# Patient Record
(demographics unavailable — no encounter records)

---

## 2024-11-12 NOTE — ASSESSMENT
[FreeTextEntry1] : She is clinically stable.  No evidence of any disease. Return to office in 3 to 4 months.  Check  today. Healthcare maintenance is not completely up-to-date.  She is due for her mammogram soon.  Return to office in another 3 to 4 months.

## 2024-11-12 NOTE — REASON FOR VISIT
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 [Spouse] : spouse

## 2024-11-12 NOTE — LETTER BODY
[Attached please find my note.] : Attached please find my note. [FreeTextEntry2] :  [place] [address] [place] [address] Tel [phone] Fax [phone]  Dear Dr White   Ms Jocelyne Barthelus was seen in my office for a surveillance examination.  Please see my consult note for her plan of care.  Thank you for allowing me to participate in the care of this patient.    Please do not hesitate to call if you have any questions.    Most Sincerely,      Mehul Youssef MD [facility] Director, [facility] Professor of Obstetrics and Gynecology, [employer] [facility] Division of Gynecologic Oncology Department Obstetrics and Gynecology Tel [phone] [email or web address]

## 2024-11-12 NOTE — PHYSICAL EXAM
[Chaperone Present] : A chaperone was present in the examining room during all aspects of the physical examination [86672] : A chaperone was present during the pelvic exam. [FreeTextEntry2] : Carolyn [Present] : CVAT: present [Absent] : Adnexa(ae): Absent [Normal] : Anus and perineum: Normal sphincter tone, no masses, no prolapse. [de-identified] : surgical incisions in tact [de-identified] : Vaginal apex intact.  Manual examination revealed a closed wound no nodularity.  No masses no tenderness no guarding on bimanual examination. [Fully active, able to carry on all pre-disease performance without restriction] : Status 0 - Fully active, able to carry on all pre-disease performance without restriction

## 2024-11-12 NOTE — REVIEW OF SYSTEMS
[Negative] : Musculoskeletal [Rash] : no rash noted [Hematuria] : no hematuria [Dysuria] : no dysuria [Ulcer] : no ulcer [URI Sx] : no ulcer sx [Chest Pain] : no chest pain [SOB on Exertion] : no shortness of breath during exertion [FreeTextEntry3] : insomnia [de-identified] : constipation, right side abdominal pain

## 2024-11-12 NOTE — HISTORY OF PRESENT ILLNESS
[FreeTextEntry1] : Patient is here for follow-up of her borderline serous tumor of the ovary. Patient was accompanied by her .    She is a 70-year old lady status post a robotic assisted laparoscopic hysterectomy bilateral salpingo-oophorectomy, omentectomy for what frozen section revealed as a borderline tumor of the ovary.  The patient feels fine today.  She has no complaints.  She has had normal GI and  function.  Her weight has increased by 3 pounds since her last visit.  The patient reported no bleeding.  7/26/3034 She felt well today.  She had no complaints of any GI or  function.  She pointed to a skin lesion on the back which appeared to be abscess versus a blocked sebaceous cyst gland.  Patient reported discomfort.  She has had this examined and taken care of in the past every 2 to 3 years or so.  4/4/2024: Patient reports right-sided abdominal pain rated 5-6/10. She does not take medication for the pain. Appetite is not great, but still eats. Weight is stable. Reports constipation sometimes, 3-4 days without a BM. Last BM was on Monday, 4/1/24. Blood pressure still elevated today despite our previous discussion with her about the importance of taking her BP medication, (/84, P85 today). She still does not take her BP medication every day. Denies fevers, vaginal discharge.  Specimen(s) Submitted 1 Uterus,cervix,bilateral tubes and bilateral ovaries 2 Omentum tissue  Final Diagnosis 1. Uterus, cervix, bilateral tubes and ovaries, total hysterectomy with bilateral salpingo-oophorectomy: - Right ovary: Serous borderline tumor, arising in a background of  endometriotic cyst. No surface involvement identified. See comment - Uterus, cervix: Benign uterus with basaloid endometrium, benign cervix - Left ovary and bilateral tubes: Unremarkable left ovary and tubes  2. Omentum, excision: - Benign lobulated omental adipose tissue - Benign incidental lymph nodes (0/2)  Specimen(s) Submitted PELVIC WASH  Final Diagnosis PELVIC WASH NEGATIVE FOR MALIGNANT CELLS.  Cytology slide shows benign mesothelial cells with histiocytes.   Onc Hx Initial diagnosis pelvic mass 2/7/2024 seen on MRI and on CT scan 2/9/24 Surgery 2/26/24 robotic assisted total laparoscopic hysterectomy bilateral salpingo-oophorectomy and omentectomy  Pathology was a right ovarian serous borderline tumor arising in the background of endometriotic cyst.  Healthcare maintenance Pap smear 2023 Mammogram 11/2023  she is aware that she is due for one Colonoscopy she has never had 1 done as she declined

## 2025-03-11 NOTE — HISTORY OF PRESENT ILLNESS
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  The patient was diagnosed with a right ovarian serous borderline tumor, and was status post arobotic assisted total laparoscopic hysterectomy bilateral salpingo-oophorectomy and omentectomy. Procedure was done on 02/26/24.   Last CT scan (02/09/2024-[facility])  Patient felt well today. She reported no fever, chills, nausea, vomiting, diarrhea, or vaginal bleeding.  There was no any changes in bowel or bladder habits. Regular BM and voiding freely. There was no change in appetite or unintentional weight loss or gain.  === VISIT HISTORY 11/12/2024 The patient felt fine.  She had no complaints.  She had had normal GI and  function.  Her weight had increased by 3 pounds since her last visit.  The patient reported no bleeding.  7/26/2024 She felt well today.  She had no complaints of any GI or  function.  She pointed to a skin lesion on the back which appeared to be abscess versus a blocked sebaceous cyst gland.  Patient reported discomfort.  She has had this examined and taken care of in the past every 2 to 3 years or so.  04/04/2024: Patient reports right-sided abdominal pain rated 5-6/10. She does not take medication for the pain. Appetite is not great, but still eats. Weight is stable. Reports constipation sometimes, 3-4 days without a BM. Last BM was on Monday, 4/1/24. Blood pressure still elevated today despite our previous discussion with her about the importance of taking her BP medication, (/84, P85 today). She still does not take her BP medication every day. Denies fevers, vaginal discharge.  === PATHOLOGY REPORT (02/26/2024-[facility]) Final Diagnosis 1. Uterus, cervix, bilateral tubes and ovaries, total hysterectomy with bilateral salpingo-oophorectomy: - Right ovary: Serous borderline tumor, arising in a background ofendometriotic cyst. No surface involvement identified. See comment - Uterus, cervix: Benign uterus with basaloid endometrium, benign cervix - Left ovary and bilateral tubes: Unremarkable left ovary and tubes 2. Omentum, excision: - Benign lobulated omental adipose tissue - Benign incidental lymph nodes (0/2)  PELVIC WASH: NEGATIVE FOR MALIGNANT CELLS. Cytology slide shows benign mesothelial cells with histiocytes.  === ONCOLOGY HISTORY 02/07/2024 Initial diagnosis pelvic mass seen on MRI (11/01/2023-[facility]) and on CT scan (02/09/2024-[facility]) 02/26/2024 SURGERY: Robotic assisted total laparoscopic hysterectomy bilateral salpingo-oophorectomy and omentectomy. Pathology was a right ovarian serous borderline tumor arising in the background of endometriotic cyst.  HEALTH MAINTENANCE:   Paps (02/02/2024-[facility]) NILM.  HPV HR(+)** Mammogram 11/2023  she is aware that she is due for one  she has not had one last year.  She said she would get it from her PCP Colonoscopy she has never had 1 done as she declined.  Patient said she did have one but it was over ten years ago  she would get a referral from her PCP

## 2025-03-11 NOTE — HISTORY OF PRESENT ILLNESS
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  The patient was diagnosed with a right ovarian serous borderline tumor, and was status post arobotic assisted total laparoscopic hysterectomy bilateral salpingo-oophorectomy and omentectomy. Procedure was done on 02/26/24.   Last CT scan (02/09/2024-[facility])  Patient felt well today. She reported no fever, chills, nausea, vomiting, diarrhea, or vaginal bleeding.  There was no any changes in bowel or bladder habits. Regular BM and voiding freely. There was no change in appetite or unintentional weight loss or gain.  === VISIT HISTORY 11/12/2024 The patient felt fine.  She had no complaints.  She had had normal GI and  function.  Her weight had increased by 3 pounds since her last visit.  The patient reported no bleeding.  7/26/2024 She felt well today.  She had no complaints of any GI or  function.  She pointed to a skin lesion on the back which appeared to be abscess versus a blocked sebaceous cyst gland.  Patient reported discomfort.  She has had this examined and taken care of in the past every 2 to 3 years or so.  04/04/2024: Patient reports right-sided abdominal pain rated 5-6/10. She does not take medication for the pain. Appetite is not great, but still eats. Weight is stable. Reports constipation sometimes, 3-4 days without a BM. Last BM was on Monday, 4/1/24. Blood pressure still elevated today despite our previous discussion with her about the importance of taking her BP medication, (/84, P85 today). She still does not take her BP medication every day. Denies fevers, vaginal discharge.  === PATHOLOGY REPORT (02/26/2024-[facility]) Final Diagnosis 1. Uterus, cervix, bilateral tubes and ovaries, total hysterectomy with bilateral salpingo-oophorectomy: - Right ovary: Serous borderline tumor, arising in a background ofendometriotic cyst. No surface involvement identified. See comment - Uterus, cervix: Benign uterus with basaloid endometrium, benign cervix - Left ovary and bilateral tubes: Unremarkable left ovary and tubes 2. Omentum, excision: - Benign lobulated omental adipose tissue - Benign incidental lymph nodes (0/2)  PELVIC WASH: NEGATIVE FOR MALIGNANT CELLS. Cytology slide shows benign mesothelial cells with histiocytes.  === ONCOLOGY HISTORY 02/07/2024 Initial diagnosis pelvic mass seen on MRI (11/01/2023-[facility]) and on CT scan (02/09/2024-[facility]) 02/26/2024 SURGERY: Robotic assisted total laparoscopic hysterectomy bilateral salpingo-oophorectomy and omentectomy. Pathology was a right ovarian serous borderline tumor arising in the background of endometriotic cyst.  HEALTH MAINTENANCE:   Paps (02/02/2024-[facility]) NILM.  HPV HR(+)** Mammogram 11/2023  she is aware that she is due for one  she has not had one last year.  She said she would get it from her PCP Colonoscopy she has never had 1 done as she declined.  Patient said she did have one but it was over ten years ago  she would get a referral from her PCP

## 2025-03-11 NOTE — ASSESSMENT
[FreeTextEntry1] : The patient is clinically stable. She has no evidence of disease.   Last CT scan (02/09/2024-[facility]).   Blood tests today  Discussed importance of diet and exercise.   need to update her HCM as discussed with patient and  Return to the office visit in 3 months or earlier if the patient feels there is a need.  All questions have been answered.

## 2025-03-11 NOTE — REASON FOR VISIT
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

## 2025-03-11 NOTE — ASSESSMENT
[FreeTextEntry1] : The patient is clinically stable. She has no evidence of disease.   Last CT scan (02/09/2024-[facility]).   Blood tests today  Discussed importance of diet and exercise.   need to update her HCM as discussed with patient and  Return to the office visit in 3 months or earlier if the patient feels there is a need.  All questions have been answered.

## 2025-03-11 NOTE — REVIEW OF SYSTEMS
[de-identified] : constipation, right side abdominal pain [Negative] : Gastrointestinal [Rash] : no rash noted [Hematuria] : no hematuria [Dysuria] : no dysuria [Ulcer] : no ulcer [URI Sx] : no ulcer sx [Chest Pain] : no chest pain [SOB on Exertion] : no shortness of breath during exertion [FreeTextEntry3] : insomnia

## 2025-03-11 NOTE — PHYSICAL EXAM
[Chaperone Present] : A chaperone was present in the examining room during all aspects of the physical examination [Present] : CVAT: present [Absent] : Adnexa(ae): Absent [Fully active, able to carry on all pre-disease performance without restriction] : Status 0 - Fully active, able to carry on all pre-disease performance without restriction [Normal] : Bimanual Exam: Normal [FreeTextEntry2] : Carolyn [de-identified] : surgical incisions in tact [de-identified] : no mass palpated no nodularity noted not tender [de-identified] : Normal sphincter tone, smooth rectovaginal septum, no cul-de-sac nodules. No mass or prolapses.

## 2025-03-11 NOTE — PHYSICAL EXAM
[Chaperone Present] : A chaperone was present in the examining room during all aspects of the physical examination [Present] : CVAT: present [Absent] : Adnexa(ae): Absent [Fully active, able to carry on all pre-disease performance without restriction] : Status 0 - Fully active, able to carry on all pre-disease performance without restriction [Normal] : Bimanual Exam: Normal [FreeTextEntry2] : Carolyn [de-identified] : surgical incisions in tact [de-identified] : no mass palpated no nodularity noted not tender [de-identified] : Normal sphincter tone, smooth rectovaginal septum, no cul-de-sac nodules. No mass or prolapses.

## 2025-06-10 NOTE — ASSESSMENT
[FreeTextEntry1] : The patient is clinically stable with no current evidence of disease.  Last CT scan A/P was performed on 02/09/2024 at [facility].  We will consider repeat imaging if ca125 elevated. CA-125 blood test has been sent today. Health Maintenance: Up to date.  Discussed the importance of maintaining a healthy diet and regular exercise. The patient is advised to return for follow-up in 3 months, or sooner if any concerning symptoms arise.  All questions were addressed during the visit.

## 2025-06-10 NOTE — REASON FOR VISIT
[Spouse] : spouse [FreeTextEntry1] :  The patient presents for a 3-month surveillance evaluation for a right ovarian serous borderline tumor.

## 2025-06-10 NOTE — LETTER BODY
[Attached please find my note.] : Attached please find my note. [FreeTextEntry2] :  [place] [address] [place] [address] Tel [phone] Fax [phone]  Dear Dr White   Ms Jocelyne Barthelus was seen in my office for a follow up evaluation.  Please see my consult note for her plan of care.  Thank you for allowing me to participate in the care of this patient.    Please do not hesitate to call if you have any questions.    Most Sincerely,      Mehul Youssef MD [facility] Director, [facility] Professor of Obstetrics and Gynecology, [facility] [employer] at [facility] Division of Gynecologic Oncology Department Obstetrics and Gynecology Tel [phone] or [phone] Fax [phone] [email or web address]

## 2025-06-10 NOTE — PHYSICAL EXAM
[Present] : CVAT: present [Absent] : Adnexa(ae): Absent [Normal] : Anus and perineum: Normal sphincter tone, no masses, no prolapse. [Fully active, able to carry on all pre-disease performance without restriction] : Status 0 - Fully active, able to carry on all pre-disease performance without restriction [Chaperoned Physical Exam] : A chaperone was present in the examining room during all aspects of the physical examination. [MA] : MA [FreeTextEntry2] : Carolyn [de-identified] : surgical incisions in tact [de-identified] : no mass palpated no nodularity  noted not tender [de-identified] : Normal sphincter tone, smooth rectovaginal septum, no cul-de-sac nodules. No mass or prolapses.

## 2025-06-10 NOTE — ASSESSMENT
[FreeTextEntry1] : The patient is clinically stable with no current evidence of disease.  Last CT scan A/P was performed on 02/09/2024 at [facility].  We will consider repeat imaging if ca125 elevated. CA-125 blood test has been sent today. Health Maintenance: Up to date.  Discussed the importance of maintaining a healthy diet and regular exercise. The patient is advised to return for follow-up in 3 months, or sooner if any concerning symptoms arise.  All questions were addressed during the visit.

## 2025-06-10 NOTE — REVIEW OF SYSTEMS
[Rash] : no rash noted [Negative] : Skin [Dysuria] : no dysuria [Hematuria] : no hematuria [Ulcer] : no ulcer [URI Sx] : no ulcer sx [Chest Pain] : no chest pain [SOB on Exertion] : no shortness of breath during exertion [FreeTextEntry3] : insomnia

## 2025-06-10 NOTE — PHYSICAL EXAM
[Present] : CVAT: present [Absent] : Adnexa(ae): Absent [Normal] : Anus and perineum: Normal sphincter tone, no masses, no prolapse. [Fully active, able to carry on all pre-disease performance without restriction] : Status 0 - Fully active, able to carry on all pre-disease performance without restriction [Chaperoned Physical Exam] : A chaperone was present in the examining room during all aspects of the physical examination. [MA] : MA [FreeTextEntry2] : Carolyn [de-identified] : surgical incisions in tact [de-identified] : no mass palpated no nodularity  noted not tender [de-identified] : Normal sphincter tone, smooth rectovaginal septum, no cul-de-sac nodules. No mass or prolapses.

## 2025-06-10 NOTE — LETTER BODY
[Attached please find my note.] : Attached please find my note. [FreeTextEntry2] :  [place] [address] [place] [address] Tel [phone] Fax [phone]  Dear Dr White   Ms Jocelyne Barthelus was seen in my office for a follow up evaluation.  Please see my consult note for her plan of care.  Thank you for allowing me to participate in the care of this patient.    Please do not hesitate to call if you have any questions.    Most Sincerely,      Mehul Youssef MD [facility] Director, [facility] Professor of Obstetrics and Gynecology, [facility] [employer] at [facility] Division of Gynecologic Oncology Department Obstetrics and Gynecology Tel [phone] or [phone] Fax [phone] [email or web address]

## 2025-06-10 NOTE — HISTORY OF PRESENT ILLNESS
[FreeTextEntry1] : The patient presents for a 3-month surveillance evaluation for a right ovarian serous borderline tumor.   She was s/p of robotic-assisted total laparoscopic hysterectomy, bilateral salpingo-oophorectomy, and omentectomy, with the diagnosis of right ovarian serous borderline tumor on 02/26/2024.   Patient felt well today. Denied fever, chills, nausea, vomiting, or vaginal bleeding. Denied any changes in bowel or bladder habits. . Denied changes in appetite or unintentional weight loss or gain. no pain.    === VISIT HISTORY 11/12/2024 The patient felt fine.  She had no complaints.  She had had normal GI and  function.  Her weight had increased by 3 pounds since her last visit.  The patient reported no bleeding.  07/26/2024 She felt well today.  She had no complaints of any GI or  function.  She pointed to a skin lesion on the back which appeared to be abscess versus a blocked sebaceous cyst gland.  Patient reported discomfort.  She has had this examined and taken care of in the past every 2 to 3 years or so.  04/04/2024 Patient reports right-sided abdominal pain rated 5-6/10. She does not take medication for the pain. Appetite is not great, but still eats. Weight is stable. Reports constipation sometimes, 3-4 days without a BM. Last BM was on Monday, 4/1/24. Blood pressure still elevated today despite our previous discussion with her about the importance of taking her BP medication, (/84, P85 today). She still does not take her BP medication every day. Denies fevers, vaginal discharge.  === PATHOLOGY REPORT  Final Diagnosis (02/26/2024-[facility]) 1. Uterus, cervix, bilateral tubes and ovaries, total hysterectomy with bilateral salpingo-oophorectomy: - Right ovary: Serous borderline tumor, arising in a background ofendometriotic cyst. No surface involvement identified. See comment - Uterus, cervix: Benign uterus with basaloid endometrium, benign cervix - Left ovary and bilateral tubes: Unremarkable left ovary and tubes 2. Omentum, excision: - Benign lobulated omental adipose tissue - Benign incidental lymph nodes (0/2) PELVIC WASH: NEGATIVE FOR MALIGNANT CELLS. Cytology slide shows benign mesothelial cells with histiocytes.  === ONCOLOGY HISTORY 02/07/2024 Initial diagnosis pelvic mass seen on MRI (11/01/2023-[facility]) and on CT scan (02/09/2024-[facility]) 02/26/2024 SURGERY: Robotic assisted total laparoscopic hysterectomy bilateral salpingo-oophorectomy and omentectomy. Pathology was a right ovarian serous borderline tumor arising in the background of endometriotic cyst.  HEALTH MAINTENANCE:   Paps (02/02/2024-[facility]) NILM.  HPV HR(+)** Mammogram 11/2023  she is aware that she is due for one  she has not had one last year.  She said she would get it from her PCP Colonoscopy she has never had 1 done as she declined.  Patient said she did have one but it was over ten years ago  she would get a referral from her PCP

## 2025-06-10 NOTE — HISTORY OF PRESENT ILLNESS
[FreeTextEntry1] : The patient presents for a 3-month surveillance evaluation for a right ovarian serous borderline tumor.   She was s/p of robotic-assisted total laparoscopic hysterectomy, bilateral salpingo-oophorectomy, and omentectomy, with the diagnosis of right ovarian serous borderline tumor on 02/26/2024.   Patient felt well today. Denied fever, chills, nausea, vomiting, or vaginal bleeding. Denied any changes in bowel or bladder habits. . Denied changes in appetite or unintentional weight loss or gain. no pain.    === VISIT HISTORY 11/12/2024 The patient felt fine.  She had no complaints.  She had had normal GI and  function.  Her weight had increased by 3 pounds since her last visit.  The patient reported no bleeding.  07/26/2024 She felt well today.  She had no complaints of any GI or  function.  She pointed to a skin lesion on the back which appeared to be abscess versus a blocked sebaceous cyst gland.  Patient reported discomfort.  She has had this examined and taken care of in the past every 2 to 3 years or so.  04/04/2024 Patient reports right-sided abdominal pain rated 5-6/10. She does not take medication for the pain. Appetite is not great, but still eats. Weight is stable. Reports constipation sometimes, 3-4 days without a BM. Last BM was on Monday, 4/1/24. Blood pressure still elevated today despite our previous discussion with her about the importance of taking her BP medication, (/84, P85 today). She still does not take her BP medication every day. Denies fevers, vaginal discharge.  === PATHOLOGY REPORT  Final Diagnosis (02/26/2024-[facility]) 1. Uterus, cervix, bilateral tubes and ovaries, total hysterectomy with bilateral salpingo-oophorectomy: - Right ovary: Serous borderline tumor, arising in a background ofendometriotic cyst. No surface involvement identified. See comment - Uterus, cervix: Benign uterus with basaloid endometrium, benign cervix - Left ovary and bilateral tubes: Unremarkable left ovary and tubes 2. Omentum, excision: - Benign lobulated omental adipose tissue - Benign incidental lymph nodes (0/2) PELVIC WASH: NEGATIVE FOR MALIGNANT CELLS. Cytology slide shows benign mesothelial cells with histiocytes.  === ONCOLOGY HISTORY 02/07/2024 Initial diagnosis pelvic mass seen on MRI (11/01/2023-[facility]) and on CT scan (02/09/2024-[facility]) 02/26/2024 SURGERY: Robotic assisted total laparoscopic hysterectomy bilateral salpingo-oophorectomy and omentectomy. Pathology was a right ovarian serous borderline tumor arising in the background of endometriotic cyst.  HEALTH MAINTENANCE:   Paps (02/02/2024-[facility]) NILM.  HPV HR(+)** Mammogram 11/2023  she is aware that she is due for one  she has not had one last year.  She said she would get it from her PCP Colonoscopy she has never had 1 done as she declined.  Patient said she did have one but it was over ten years ago  she would get a referral from her PCP

## 2025-06-10 NOTE — REVIEW OF SYSTEMS
[Rash] : no rash noted [Negative] : Skin [Hematuria] : no hematuria [Dysuria] : no dysuria [Ulcer] : no ulcer [URI Sx] : no ulcer sx [Chest Pain] : no chest pain [SOB on Exertion] : no shortness of breath during exertion [FreeTextEntry3] : insomnia